# Patient Record
Sex: MALE | Race: WHITE
[De-identification: names, ages, dates, MRNs, and addresses within clinical notes are randomized per-mention and may not be internally consistent; named-entity substitution may affect disease eponyms.]

---

## 2017-03-23 ENCOUNTER — HOSPITAL ENCOUNTER (EMERGENCY)
Dept: HOSPITAL 45 - C.EDB | Age: 72
Discharge: HOME | End: 2017-03-23
Payer: COMMERCIAL

## 2017-03-23 ENCOUNTER — HOSPITAL ENCOUNTER (OUTPATIENT)
Dept: HOSPITAL 45 - C.RAD1850 | Age: 72
Discharge: HOME | End: 2017-03-23
Attending: NURSE PRACTITIONER
Payer: COMMERCIAL

## 2017-03-23 VITALS
WEIGHT: 207.68 LBS | BODY MASS INDEX: 28.13 KG/M2 | BODY MASS INDEX: 28.13 KG/M2 | HEIGHT: 72.01 IN | HEIGHT: 72.01 IN | WEIGHT: 207.68 LBS

## 2017-03-23 VITALS — TEMPERATURE: 99.68 F | OXYGEN SATURATION: 97 %

## 2017-03-23 VITALS — HEART RATE: 60 BPM | SYSTOLIC BLOOD PRESSURE: 126 MMHG | DIASTOLIC BLOOD PRESSURE: 67 MMHG

## 2017-03-23 DIAGNOSIS — R07.81: ICD-10-CM

## 2017-03-23 DIAGNOSIS — R07.81: Primary | ICD-10-CM

## 2017-03-23 DIAGNOSIS — R93.8: ICD-10-CM

## 2017-03-23 DIAGNOSIS — Z79.899: ICD-10-CM

## 2017-03-23 DIAGNOSIS — Z79.82: ICD-10-CM

## 2017-03-23 DIAGNOSIS — I45.10: ICD-10-CM

## 2017-03-23 DIAGNOSIS — Z88.8: ICD-10-CM

## 2017-03-23 DIAGNOSIS — I71.4: ICD-10-CM

## 2017-03-23 DIAGNOSIS — Z87.442: ICD-10-CM
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
## 2017-03-23 DIAGNOSIS — I27.2: ICD-10-CM

## 2017-03-23 DIAGNOSIS — S20.211A: Primary | ICD-10-CM

## 2017-03-23 LAB
ANION GAP SERPL CALC-SCNC: 19 MMOL/L (ref 16–25)
ANION GAP SERPL CALC-SCNC: 7 MMOL/L (ref 3–11)
BASOPHILS # BLD: 0.03 K/UL (ref 0–0.2)
BASOPHILS NFR BLD: 0.5 %
BUN SERPL-MCNC: 17 MG/DL (ref 7–18)
BUN/CREAT SERPL: 18.8 (ref 10–20)
CA-I BLD-SCNC: 1.17 MMOL/L (ref 1.12–1.32)
CALCIUM SERPL-MCNC: 8.7 MG/DL (ref 8.5–10.1)
CHLORIDE BLD-SCNC: 100 MEQ/L (ref 101–112)
CHLORIDE SERPL-SCNC: 104 MMOL/L (ref 98–107)
CO2 SERPL-SCNC: 29 MMOL/L (ref 21–32)
COMPLETE: YES
CREAT BLD-MCNC: 0.8 MG/DL (ref 0.6–1.3)
CREAT CL PREDICTED SERPL C-G-VRATE: 93.1 ML/MIN
CREAT SERPL-MCNC: 0.88 MG/DL (ref 0.6–1.4)
EOSINOPHIL NFR BLD AUTO: 226 K/UL (ref 130–400)
GLUCOSE SERPL-MCNC: 157 MG/DL (ref 70–99)
HCT VFR BLD AUTO: 41 % (ref 42–52)
HCT VFR BLD CALC: 40.2 % (ref 42–52)
HGB BLD-MCNC: 13.9 G/DL (ref 14–18)
IG%: 0.3 %
IMM GRANULOCYTES NFR BLD AUTO: 23 %
ISTAT CARBON DIOXIDE: 27 MEQ/L (ref 24–31)
LYMPHOCYTES # BLD: 1.45 K/UL (ref 1.2–3.4)
MCH RBC QN AUTO: 30.5 PG (ref 25–34)
MCHC RBC AUTO-ENTMCNC: 33.1 G/DL (ref 32–36)
MCV RBC AUTO: 92.2 FL (ref 80–100)
MONOCYTES NFR BLD: 6.2 %
NEUTROPHILS # BLD AUTO: 1.3 %
NEUTROPHILS NFR BLD AUTO: 68.7 %
PMV BLD AUTO: 10.1 FL (ref 7.4–10.4)
POTASSIUM SERPL-SCNC: 3.9 MMOL/L (ref 3.5–5.1)
RBC # BLD AUTO: 4.36 M/UL (ref 4.7–6.1)
SODIUM BLD-SCNC: 141 MEQ/L (ref 135–144)
SODIUM SERPL-SCNC: 140 MMOL/L (ref 136–145)
WBC # BLD AUTO: 6.3 K/UL (ref 4.8–10.8)

## 2017-03-23 NOTE — EMERGENCY ROOM VISIT NOTE
History


First contact with patient:  16:02


 (Quoc Lira MD)


First contact with patient:  16:00


 (Ifeanyi Spain M.D.)


Chief Complaint:  REFERRED BY DOCTOR


Stated Complaint:  NEED CAT SCAN ON CHEST





History of Present Illness


The patient is a 71 year old male who presents to the Emergency Room with 

complaints of chest wall trauma. 3 days ago, patient was lifting a heavy table 

and while attempting to carry the table through a doorway, it hit the door jam 

and he got hit in the Right  chest. Patient experienced associated generalized 

chest pain worse with inspiration. Pain improved significantly with Ibuprofen 

600mg but after 3 days, the pain never completely subsided, so he decided to go 

into [facility] walk-in clinic for evaluation. There, he received a CXR showing 

pulmonary infiltrate and what sent for further evaluation in the ED.  Currently 

he has minimal pain at rest and 5/10 pain on inspiration. 





He denies, Cough, fevers/chills. Pt denies headache, change in vision, fevers, 

, shortness of breath, nausea, vomiting, diarrhea, pain with urination, and 

melena.





 


 (Quoc Lira MD)





Review of Systems


See HPI for pertinent positives & negatives. A total of 10 systems reviewed and 

were otherwise negative.


 (Quoc Lira MD)





Past Medical/Surgical History


Medical Problems:


(1) Recurrent kidney stones


 (Ifeanyi Spain M.D.)





Family History





No pertinent family history (Quoc Lira MD)





No pertinent family history (Ifeanyi Spain M.D.)


Social History


Smoking Status:  Never Smoker


Marital Status:  


Housing Status:  lives with significant other


 (Quoc Lira MD)





Current/Historical Medications


Scheduled


Ascorbic Acid (Ascorbic Acid), 1 TAB PO DAILY


Aspirin (Aspirin Ec), 81 MG PO DAILY


Cholecalciferol (Vitamin D3), 1 CAP PO DAILY


Coenzyme Q10 (Ubidecarenone) (Co-Enzyme Q10), 200 MG PO BID


Cyanocobalamin (Vitamin B12), 1,200 MCG PO DAILY


Fish Oil (Omega-3), 1,200 MG PO DAILY


Levothyroxine Sodium (Levothyroxine Sodium), 1 TAB PO DAILY


Losartan Potassium (Cozaar), 50 MG PO DAILY


Metformin Hcl (Glucophage), 1,000 MG PO BID


Simvastatin (Zocor), 20 MG PO QPM





Allergies


Coded Allergies:  


     Atorvastatin (Verified  Allergy, Mild, ., 3/23/17)


 MUSCLE CRAMPING





Physical Exam


Vital Signs











  Date Time  Temp Pulse Resp B/P Pulse Ox O2 Delivery O2 Flow Rate FiO2


 


3/23/17 18:47  60 16 126/67    


 


3/23/17 17:36  57 17 129/79  Room Air  


 


3/23/17 15:58 37.6 69 18 144/83 97 Room Air  





 (Ifeanyi Spain M.D.)





Physical Exam


GENERAL: alert, well appearing, well nourished, no distress, non-toxic 


EYE EXAM: normal conjunctiva, PERRL and EOM's grossly intact


NECK: supple, no nuchal rigidity, no adenopathy, non-tender


LUNGS: Clear to auscultation. Normal chest wall mechanics


HEART: no murmurs, S1 normal and S2 normal 


CHEST : Right sided lower rib tenderness to palpation, no abrasion, no erythema

, no ecchymoses 


ABDOMEN: abdomen soft, non-tender, normo-active bowel sounds, no masses, no 

rebound or guarding. 


SKIN: no rashes and no bruising 


UPPER EXTREMITIES: upper extremities are grossly normal. 


LOWER EXTREMITIES: No pitting edema.





 (Quoc Lira MD)





Medical Decision & Procedures


ER Provider


Diagnostic Interpretation:


CT SCAN OF THE CHEST WITH IV CONTRAST





CLINICAL HISTORY: Unspecified chest trauma.





COMPARISON STUDY:  Chest x-ray dated 3/23/2017. Abdominal CT dated 11/11/2016.





TECHNIQUE: Following the IV administration of 93 cc of Optiray 320, CT scan of


the thorax was performed from the thoracic inlet to the upper abdomen. Images


are reviewed in the axial, sagittal, and coronal planes. IV contrast was


administered without complication.





CT DOSE: 403.70 mGycm





FINDINGS:





Thyroid: Markedly atrophic.





Thoracic aorta: There is atherosclerotic calcification of the thoracic aorta.


There is mild aneurysmal dilatation of the ascending thoracic aorta which


measures up to 4.2 cm. The remainder of the thoracic aorta is normal in caliber


and the arch demonstrates standard 3-vessel anatomy. No dissection is seen.





Pulmonary vasculature: The main pulmonary arteries are significantly dilated


suggesting pulmonary artery hypertension. There are no filling defects


identified in the central pulmonary vessels to indicate pulmonary embolus. Note


that this examination was not protocoled for evaluation of the pulmonary


arteries.





Heart: The heart is mildly enlarged and without pericardial effusion. The


coronary arteries are densely calcified.





Lungs and pleural spaces: A fat-containing Bochdalek hernia is noted at the left


lung base. No airspace consolidation or pleural effusion is identified. The


trachea and central airways are clear. There is no pneumothorax. A calcified


granuloma seen at the right lung base.





Mediastinum: There is no mediastinal hematoma or lymphadenopathy.





Estefany: No hilar adenopathy is seen. There are calcified right hilar nodes.





Axillae: There is no axillary lymphadenopathy.





Upper abdomen: There is a small hiatal hernia. Diverticulosis is noted in the


partially imaged colon. There is moderate glandular atrophy of the imaged


pancreas. An indeterminant 1.2 cm nodule is noted in the left adrenal gland,


which is unchanged from previous. A small calcified granulomas noted in the


spleen.





Skeletal structures: The Skeletal structures are osteopenic. No acute fracture


is seen. No lytic or blastic bony lesions are identified. Degenerative change is


noted throughout the thoracic spine. There is a mild chronic compression


deformity of L1.








IMPRESSION:





1. There is no acute posttraumatic intrathoracic abnormality.





2. The lungs are clear. No pneumothorax is seen.





3. Cardiomegaly with evidence of pulmonary artery hypertension.





4. There is mild aneurysmal dilatation of the ascending thoracic aorta which


measures up to 4.2 cm. The remainder of the thoracic aorta is normal in caliber.





5. Additional findings as above.





 (Quoc Lira MD)





Laboratory Results


3/23/17 16:32








Red Blood Count 4.36, Mean Corpuscular Volume 92.2, Mean Corpuscular Hemoglobin 

30.5, Mean Corpuscular Hemoglobin Concent 33.1, Mean Platelet Volume 10.1, 

Neutrophils (%) (Auto) 68.7, Lymphocytes (%) (Auto) 23.0, Monocytes (%) (Auto) 

6.2, Eosinophils (%) (Auto) 1.3, Basophils (%) (Auto) 0.5, Neutrophils # (Auto) 

4.33, Lymphocytes # (Auto) 1.45, Monocytes # (Auto) 0.39, Eosinophils # (Auto) 

0.08, Basophils # (Auto) 0.03





3/23/17 16:32

















Test


  3/23/17


16:32 3/23/17


16:37


 


White Blood Count


  6.30 K/uL


(4.8-10.8) 


 


 


Red Blood Count


  4.36 M/uL


(4.7-6.1) 


 


 


Hemoglobin


  13.3 g/dL


(14.0-18.0) 


 


 


Hematocrit 40.2 % (42-52)  


 


Mean Corpuscular Volume


  92.2 fL


() 


 


 


Mean Corpuscular Hemoglobin


  30.5 pg


(25-34) 


 


 


Mean Corpuscular Hemoglobin


Concent 33.1 g/dl


(32-36) 


 


 


Platelet Count


  226 K/uL


(130-400) 


 


 


Mean Platelet Volume


  10.1 fL


(7.4-10.4) 


 


 


Neutrophils (%) (Auto) 68.7 %  


 


Lymphocytes (%) (Auto) 23.0 %  


 


Monocytes (%) (Auto) 6.2 %  


 


Eosinophils (%) (Auto) 1.3 %  


 


Basophils (%) (Auto) 0.5 %  


 


Neutrophils # (Auto)


  4.33 K/uL


(1.4-6.5) 


 


 


Lymphocytes # (Auto)


  1.45 K/uL


(1.2-3.4) 


 


 


Monocytes # (Auto)


  0.39 K/uL


(0.11-0.59) 


 


 


Eosinophils # (Auto)


  0.08 K/uL


(0-0.5) 


 


 


Basophils # (Auto)


  0.03 K/uL


(0-0.2) 


 


 


RDW Standard Deviation


  43.6 fL


(36.4-46.3) 


 


 


RDW Coefficient of Variation


  12.9 %


(11.5-14.5) 


 


 


Immature Granulocyte % (Auto) 0.3 %  


 


Immature Granulocyte # (Auto)


  0.02 K/uL


(0.00-0.02) 


 


 


Est Creatinine Clear Calc


Drug Dose 93.1 ml/min 


  


 


 


Estimated GFR (


American) 100.9 


  


 


 


Estimated GFR (Non-


American 87.0 


  


 


 


BUN/Creatinine Ratio 18.8 (10-20)  


 


Calcium Level


  8.7 mg/dl


(8.5-10.1) 


 


 


Troponin I


  < 0.015 ng/ml


(0-0.045) 


 


 


Bedside Hemoglobin


  


  13.9 g/dl


(14.0-18.0)


 


Bedside Hematocrit  41 % (42-52) 


 


Bedside Sodium


  


  141 mEq/L


(135-144)


 


Bedside Potassium


  


  3.9 mEq/L


(3.3-5.0)


 


Bedside Chloride


  


  100 mEq/L


(101-112)


 


Bedside Total CO2


  


  27 mEq/l


(24-31)


 


Anion Gap


  


  19.0 mmol/L


(16-25)


 


Bedside Blood Urea Nitrogen


  


  16 mg/dl


(7-18)


 


Bedside Creatinine


  


  0.8 mg/dl


(0.6-1.3)


 


Bedside Glucose (other)


  


  158 mg/dl


(70-99)


 


Bedside Ionized Calcium (Guido)


  


  1.17 mmol/l


(1.12-1.32)





 (Ifeanyi Spain M.D.)





Medications Administered











 Medications


  (Trade)  Dose


 Ordered  Sig/Navid


 Route  Start Time


 Stop Time Status Last Admin


Dose Admin


 


 Sodium Chloride


  (Nss 1000ml)  1,000 ml @ 


 999 mls/hr  Q1H1M STAT


 IV  3/23/17 16:19


 3/23/17 17:19 DC 3/23/17 16:47


999 MLS/HR





 (Ifeanyi Spain M.D.)





ECG


Rhythm:  sinus bradycardia


Findings:  PAC, RBBB, other (Left Ant. Fascicular BLock)


 (Quoc Lira MD)





Medical Decision


Differential diagnoses includes but is not limited to Chest wall trauma,  acute 

coronary syndrome, myocardial infarction, pericarditis, pulmonary embolus, 

aortic dissection, pneumonia, pneumothorax, musculoskeletal, shingles, 

esophageal. 





70 yo M p/w hx of chest wall trauma 3 days ago  while carrying table through a 

door way p/w with pleuritic CP, lower rib tenderness with. CXR showing 

pulmonary infiltrate


EKG Sinus bradycardia with PAC's RBBB, L Ant. fascicular block. RBBB, Left ant. 

fascicular block appeared on previous EKG (2005), Troponin neg. H&H: 13.3/40.2





Suspected Rib Contusion:


- CT Chest with Contrast: no acute posttraumatic intrathoracic abnormality. no 

ptx, pulmonary artery hypertension.  mild aneurysmal dilatation of the 

ascending thoracic aorta which


measures up to 4.2 cm. 





- Discharged with OTC Ibuprofen/ Tylenol as needed with followup with 

cardiology for abnormal EKG,  Pulmonary HTN, Dilated thoracic ascending aorta


 (Quoc Lira MD)


Resident Physician Supervision Note:


Dr. Lira was resident physician during care of patient.  I separately 

evaluated patient and did history and exam. I discussed the case with the 

resident and generally agree with the findings and plan.  Please see my full 

note for this visit.





Documented By:  Ifeanyi Spain MD


 (Ifeanyi Spain M.D.)





Impression





 Primary Impression:  


 Rib tenderness


 Additional Impressions:  


 Contusion of rib on right side


 Pulmonary hypertension


 Abnormal EKG


 Aneurysm of ascending aorta





Departure Information


Dispostion


Home / Self-Care





Condition


GOOD





Referrals


Dominique Mckeon M.D. (PCP)





Patient Instructions


My [facility]





Resident Tracking


Resident Involvement:  Resident Care Provided


Care Provided:  Adult ED


 (Quoc Lira MD)





Problem Qualifiers








 Additional Impressions:  


 Contusion of rib on right side


 Encounter type:  initial encounter  Qualified Codes:  S20.211A - Contusion of 

right front wall of thorax, initial encounter

## 2017-03-23 NOTE — DIAGNOSTIC IMAGING REPORT
CHEST 2 VIEWS ROUTINE



CLINICAL HISTORY: PLEURODYNIA chest pain



COMPARISON STUDY:  1/22/2007



FINDINGS: Small parenchymal infiltrate posterior aspect left lower lobe. Lungs

otherwise appear clear. No evidence for cardiac enlargement. 



IMPRESSION:  Small parenchymal infiltrate posterior left lower lobe 









Electronically signed by:  Marquise Diehl M.D.

3/23/2017 2:15 PM



Dictated Date/Time:  3/23/2017 2:14 PM

## 2017-03-23 NOTE — EMERGENCY ROOM VISIT NOTE
History


Report prepared by Freida:  Mark Ray


Under the Supervision of:  Dr. Ifeanyi Spain M.D.


First contact with patient:  16:00


Chief Complaint:  REFERRED BY DOCTOR


Stated Complaint:  NEED CAT SCAN ON CHEST





History of Present Illness


The patient is a 71 year old male who was referred to the Emergency Room for a 

CT chest. The patient was carrying a heavy table three days ago when he walked 

into the door frame and hit his chest with the table. The patient has had right-

sided chest pain since, which is minimal. The pain is relieved with Ibuprofen. 

The pain is worse with inspiration. The patient denies any fevers or cough. He 

was seen at a walk-in clinic earlier today where he had a Chest X-ray. The X-

ray showed infiltrate in the left lung, which is the opposite side from where 

his pain is located. The patient is not a smoker. He denies any family history 

of lung cancer.





   Source of History:  patient


   Onset:  three days ago


   Position:  chest (right)


   Modifying Factors (Worsening):  breathing (inspiration)


   Modifying Factors (Relieving):  ibuprofen


   Associated Symptoms:  No cough, No fevers





Review of Systems


See HPI for pertinent positives & negatives. A total of 10 systems reviewed and 

were otherwise negative.





Past Medical & Surgical


Medical Problems:


(1) Recurrent kidney stones








Family History





No pertinent family history





Social History


Smoking Status:  Never Smoker


Marital Status:  


Housing Status:  lives with significant other





Current/Historical Medications


Scheduled


Ascorbic Acid (Ascorbic Acid), 1 TAB PO DAILY


Aspirin (Aspirin Ec), 81 MG PO DAILY


Cholecalciferol (Vitamin D3), 1 CAP PO DAILY


Coenzyme Q10 (Ubidecarenone) (Co-Enzyme Q10), 200 MG PO BID


Cyanocobalamin (Vitamin B12), 1,200 MCG PO DAILY


Fish Oil (Omega-3), 1,200 MG PO DAILY


Levothyroxine Sodium (Levothyroxine Sodium), 1 TAB PO DAILY


Losartan Potassium (Cozaar), 50 MG PO DAILY


Metformin Hcl (Glucophage), 1,000 MG PO BID


Simvastatin (Zocor), 20 MG PO QPM





Allergies


Coded Allergies:  


     Atorvastatin (Verified  Allergy, Mild, ., 3/23/17)


 MUSCLE CRAMPING





Physical Exam


Vital Signs











  Date Time  Temp Pulse Resp B/P Pulse Ox O2 Delivery O2 Flow Rate FiO2


 


3/23/17 18:47  60 16 126/67    


 


3/23/17 17:36  57 17 129/79  Room Air  


 


3/23/17 15:58 37.6 69 18 144/83 97 Room Air  











Physical Exam


GENERAL: Patient is well appearing and in no acute distress.


HEENT: No acute trauma, normocephalic atraumatic, mucous membranes moist, no 

nasal congestion, no scleral icterus.


NECK: No stridor, no adenopathy, no meningismus, trachea is midline.


CHEST: Tender to palpation over the anterior right lower ribs, no right upper 

quadrant tenderness.


LUNGS: No dyspnea. Clear to auscultation and equal bilaterally. No wheeze, no 

rhonchi.


HEART: Regular rate and rhythm.  No murmurs, rubs, gallops appreciated.


ABDOMEN: Soft, nontender, bowel sounds positive, no masses appreciated, no 

peritonitis.


BACK: No midline tenderness, no CVA tenderness


EXTREMITIES: Normal motion all extremities, no cyanosis, no edema.


NEUROLOGIC: Alert and oriented, no acute motor or sensory deficits, no focal 

weakness, cranial nerves grossly intact.


SKIN: No rash, no jaundice, no diaphoresis.





Medical Decision & Procedures


ER Provider


Diagnostic Interpretation:


CT results as stated below per interpretation by me and the radiologist: 





CT SCAN OF THE CHEST WITH IV CONTRAST





CLINICAL HISTORY: Unspecified chest trauma.





COMPARISON STUDY:  Chest x-ray dated 3/23/2017. Abdominal CT dated 11/11/2016.





TECHNIQUE: Following the IV administration of 93 cc of Optiray 320, CT scan of


the thorax was performed from the thoracic inlet to the upper abdomen. Images


are reviewed in the axial, sagittal, and coronal planes. IV contrast was


administered without complication.





CT DOSE: 403.70 mGycm





FINDINGS:





Thyroid: Markedly atrophic.





Thoracic aorta: There is atherosclerotic calcification of the thoracic aorta.


There is mild aneurysmal dilatation of the ascending thoracic aorta which


measures up to 4.2 cm. The remainder of the thoracic aorta is normal in caliber


and the arch demonstrates standard 3-vessel anatomy. No dissection is seen.





Pulmonary vasculature: The main pulmonary arteries are significantly dilated


suggesting pulmonary artery hypertension. There are no filling defects


identified in the central pulmonary vessels to indicate pulmonary embolus. Note


that this examination was not protocoled for evaluation of the pulmonary


arteries.





Heart: The heart is mildly enlarged and without pericardial effusion. The


coronary arteries are densely calcified.





Lungs and pleural spaces: A fat-containing Bochdalek hernia is noted at the left


lung base. No airspace consolidation or pleural effusion is identified. The


trachea and central airways are clear. There is no pneumothorax. A calcified


granuloma seen at the right lung base.





Mediastinum: There is no mediastinal hematoma or lymphadenopathy.





Estefany: No hilar adenopathy is seen. There are calcified right hilar nodes.





Axillae: There is no axillary lymphadenopathy.





Upper abdomen: There is a small hiatal hernia. Diverticulosis is noted in the


partially imaged colon. There is moderate glandular atrophy of the imaged


pancreas. An indeterminant 1.2 cm nodule is noted in the left adrenal gland,


which is unchanged from previous. A small calcified granulomas noted in the


spleen.





Skeletal structures: The Skeletal structures are osteopenic. No acute fracture


is seen. No lytic or blastic bony lesions are identified. Degenerative change is


noted throughout the thoracic spine. There is a mild chronic compression


deformity of L1.








IMPRESSION:





1. There is no acute posttraumatic intrathoracic abnormality.





2. The lungs are clear. No pneumothorax is seen.





3. Cardiomegaly with evidence of pulmonary artery hypertension.





4. There is mild aneurysmal dilatation of the ascending thoracic aorta which


measures up to 4.2 cm. The remainder of the thoracic aorta is normal in caliber.





5. Additional findings as above.











Electronically signed by:  Lanre Whitfield M.D.


3/23/2017 5:48 PM





Dictated Date/Time:  3/23/2017 5:39 PM





Laboratory Results


3/23/17 16:32








Red Blood Count 4.36, Mean Corpuscular Volume 92.2, Mean Corpuscular Hemoglobin 

30.5, Mean Corpuscular Hemoglobin Concent 33.1, Mean Platelet Volume 10.1, 

Neutrophils (%) (Auto) 68.7, Lymphocytes (%) (Auto) 23.0, Monocytes (%) (Auto) 

6.2, Eosinophils (%) (Auto) 1.3, Basophils (%) (Auto) 0.5, Neutrophils # (Auto) 

4.33, Lymphocytes # (Auto) 1.45, Monocytes # (Auto) 0.39, Eosinophils # (Auto) 

0.08, Basophils # (Auto) 0.03





3/23/17 16:32

















Test


  3/23/17


16:32 3/23/17


16:37


 


White Blood Count


  6.30 K/uL


(4.8-10.8) 


 


 


Red Blood Count


  4.36 M/uL


(4.7-6.1) 


 


 


Hemoglobin


  13.3 g/dL


(14.0-18.0) 


 


 


Hematocrit 40.2 % (42-52)  


 


Mean Corpuscular Volume


  92.2 fL


() 


 


 


Mean Corpuscular Hemoglobin


  30.5 pg


(25-34) 


 


 


Mean Corpuscular Hemoglobin


Concent 33.1 g/dl


(32-36) 


 


 


Platelet Count


  226 K/uL


(130-400) 


 


 


Mean Platelet Volume


  10.1 fL


(7.4-10.4) 


 


 


Neutrophils (%) (Auto) 68.7 %  


 


Lymphocytes (%) (Auto) 23.0 %  


 


Monocytes (%) (Auto) 6.2 %  


 


Eosinophils (%) (Auto) 1.3 %  


 


Basophils (%) (Auto) 0.5 %  


 


Neutrophils # (Auto)


  4.33 K/uL


(1.4-6.5) 


 


 


Lymphocytes # (Auto)


  1.45 K/uL


(1.2-3.4) 


 


 


Monocytes # (Auto)


  0.39 K/uL


(0.11-0.59) 


 


 


Eosinophils # (Auto)


  0.08 K/uL


(0-0.5) 


 


 


Basophils # (Auto)


  0.03 K/uL


(0-0.2) 


 


 


RDW Standard Deviation


  43.6 fL


(36.4-46.3) 


 


 


RDW Coefficient of Variation


  12.9 %


(11.5-14.5) 


 


 


Immature Granulocyte % (Auto) 0.3 %  


 


Immature Granulocyte # (Auto)


  0.02 K/uL


(0.00-0.02) 


 


 


Est Creatinine Clear Calc


Drug Dose 93.1 ml/min 


  


 


 


Estimated GFR (


American) 100.9 


  


 


 


Estimated GFR (Non-


American 87.0 


  


 


 


BUN/Creatinine Ratio 18.8 (10-20)  


 


Calcium Level


  8.7 mg/dl


(8.5-10.1) 


 


 


Troponin I


  < 0.015 ng/ml


(0-0.045) 


 


 


Bedside Hemoglobin


  


  13.9 g/dl


(14.0-18.0)


 


Bedside Hematocrit  41 % (42-52) 


 


Bedside Sodium


  


  141 mEq/L


(135-144)


 


Bedside Potassium


  


  3.9 mEq/L


(3.3-5.0)


 


Bedside Chloride


  


  100 mEq/L


(101-112)


 


Bedside Total CO2


  


  27 mEq/l


(24-31)


 


Anion Gap


  


  19.0 mmol/L


(16-25)


 


Bedside Blood Urea Nitrogen


  


  16 mg/dl


(7-18)


 


Bedside Creatinine


  


  0.8 mg/dl


(0.6-1.3)


 


Bedside Glucose (other)


  


  158 mg/dl


(70-99)


 


Bedside Ionized Calcium (Guido)


  


  1.17 mmol/l


(1.12-1.32)





Laboratory results as reviewed by me.





Medications Administered











 Medications


  (Trade)  Dose


 Ordered  Sig/Navid


 Route  Start Time


 Stop Time Status Last Admin


Dose Admin


 


 Sodium Chloride


  (Nss 1000ml)  1,000 ml @ 


 999 mls/hr  Q1H1M STAT


 IV  3/23/17 16:19


 3/23/17 17:19 DC 3/23/17 16:47


999 MLS/HR











ECG


Indication:  chest pain


Rate (beats per minute):  68


Rhythm:  sinus bradycardia


Findings:  PAC, other (bifascicular block, no STEMI)


Change:


Bifascicular block is mildly increased from previous EKG dated 28 Jan. 2005.





ED Course


1600: The patient was evaluated by the Resident.





1619: NSS 1000 ml @ 999 mls/hr.





1623: The patient was evaluated in room A9b. A complete history and physical 

exam was performed.





1845: Reassessed the patient. Discussed the findings with him. He verbalized 

understanding and agreement. The patient is ready for discharge.





Medical Decision


Resident Physician Supervision Note:


Dr. Lira was resident physician during care of patient.  I separately 

evaluated patient and did history and exam. I discussed the case with the 

resident and generally agree with the findings and plan.





71 yr old male with right lower rib trauma 3 days ago followed up with UC and 

noted to have infiltrate LLL base for which he was sent to ED for further 

evaluation.  No distress though with chest trauma seems reasonable ruling out 

significant injury with CT.  LLL infiltrate not noted on exam and patient with 

no symptoms of pneumonia.  Significant second hand smoking history as well.  No 

abdominal TTP nor evidence of abdominal trauma at this time.  Labs 

unremarkable.  EKG does show some progression of interventricular block from 

previous EKG several years ago.  He is in no distress and has not had recent 

ACS like chest pain.  Labs unremarkable.  Noted to be fat hernia LLL which I 

suspect is infiltrate seen on CXR.  Mild aortic dilation though no evidence of 

dissection nor need for emergent intervention.  Pulm HTN noted by CT.  These 

plus EKG changes will need follow up with Cards and thus Case Management asked 

to help facilitate this.  No evidence fracture nor trauma fortunately on CT.





Impression





 Primary Impression:  


 Contusion of rib on right side


 Additional Impressions:  


 Abnormal EKG


 Pulmonary hypertension


 Rib tenderness


 Aneurysm of ascending aorta





Scribe Attestation


The scribe's documentation has been prepared under my direction and personally 

reviewed by me in its entirety. I confirm that the note above accurately 

reflects all work, treatment, procedures, and medical decision making performed 

by me.





Departure Information


Dispostion


Home / Self-Care





Referrals


No Doctor, Assigned (PCP)





Forms


HOME CARE DOCUMENTATION FORM,                                                 

               IMPORTANT VISIT INFORMATION, WORK / SCHOOL INSTRUCTIONS





Patient Instructions


Contusion Bone, My [facility]





Additional Instructions





Please followup with Cardiology following discharge within 1 wk


Take Tylenol or Ibupofen as needed for pain


If symptoms worsen or if you experience Chest pain, Palpitaion, Syncope, please 

call clinic or return to Emergency Dept





Problem Qualifiers








 Primary Impression:  


 Contusion of rib on right side


 Encounter type:  initial encounter  Qualified Codes:  S20.211A - Contusion of 

right front wall of thorax, initial encounter

## 2017-03-23 NOTE — DIAGNOSTIC IMAGING REPORT
CT SCAN OF THE CHEST WITH IV CONTRAST



CLINICAL HISTORY: Unspecified chest trauma.



COMPARISON STUDY:  Chest x-ray dated 3/23/2017. Abdominal CT dated 11/11/2016.



TECHNIQUE: Following the IV administration of 93 cc of Optiray 320, CT scan of

the thorax was performed from the thoracic inlet to the upper abdomen. Images

are reviewed in the axial, sagittal, and coronal planes. IV contrast was

administered without complication.



CT DOSE: 403.70 mGycm



FINDINGS:



Thyroid: Markedly atrophic.



Thoracic aorta: There is atherosclerotic calcification of the thoracic aorta.

There is mild aneurysmal dilatation of the ascending thoracic aorta which

measures up to 4.2 cm. The remainder of the thoracic aorta is normal in caliber

and the arch demonstrates standard 3-vessel anatomy. No dissection is seen.



Pulmonary vasculature: The main pulmonary arteries are significantly dilated

suggesting pulmonary artery hypertension. There are no filling defects

identified in the central pulmonary vessels to indicate pulmonary embolus. Note

that this examination was not protocoled for evaluation of the pulmonary

arteries.



Heart: The heart is mildly enlarged and without pericardial effusion. The

coronary arteries are densely calcified.



Lungs and pleural spaces: A fat-containing Bochdalek hernia is noted at the left

lung base. No airspace consolidation or pleural effusion is identified. The

trachea and central airways are clear. There is no pneumothorax. A calcified

granuloma seen at the right lung base.



Mediastinum: There is no mediastinal hematoma or lymphadenopathy.



Estefany: No hilar adenopathy is seen. There are calcified right hilar nodes.



Axillae: There is no axillary lymphadenopathy.



Upper abdomen: There is a small hiatal hernia. Diverticulosis is noted in the

partially imaged colon. There is moderate glandular atrophy of the imaged

pancreas. An indeterminant 1.2 cm nodule is noted in the left adrenal gland,

which is unchanged from previous. A small calcified granulomas noted in the

spleen.



Skeletal structures: The Skeletal structures are osteopenic. No acute fracture

is seen. No lytic or blastic bony lesions are identified. Degenerative change is

noted throughout the thoracic spine. There is a mild chronic compression

deformity of L1.





IMPRESSION:



1. There is no acute posttraumatic intrathoracic abnormality.



2. The lungs are clear. No pneumothorax is seen.



3. Cardiomegaly with evidence of pulmonary artery hypertension.



4. There is mild aneurysmal dilatation of the ascending thoracic aorta which

measures up to 4.2 cm. The remainder of the thoracic aorta is normal in caliber.



5. Additional findings as above.







Electronically signed by:  Lanre Whitfield M.D.

3/23/2017 5:48 PM



Dictated Date/Time:  3/23/2017 5:39 PM

## 2017-05-16 ENCOUNTER — HOSPITAL ENCOUNTER (OUTPATIENT)
Dept: HOSPITAL 45 - C.RDSM | Age: 72
Discharge: HOME | End: 2017-05-16
Attending: ORTHOPAEDIC SURGERY
Payer: COMMERCIAL

## 2017-05-16 DIAGNOSIS — M25.562: ICD-10-CM

## 2017-05-16 DIAGNOSIS — M25.561: Primary | ICD-10-CM

## 2018-03-02 ENCOUNTER — HOSPITAL ENCOUNTER (OUTPATIENT)
Dept: HOSPITAL 45 - C.RDSM | Age: 73
Discharge: HOME | End: 2018-03-02
Attending: PHYSICIAN ASSISTANT
Payer: COMMERCIAL

## 2018-03-02 DIAGNOSIS — M25.559: Primary | ICD-10-CM

## 2018-03-02 NOTE — DIAGNOSTIC IMAGING REPORT
R HIP UNILATERAL MIN 2 VIEWS



CLINICAL HISTORY: RIGHT HIP PAIN pain



COMPARISON: None.



DISCUSSION: Moderate to rather significant degenerative change right hip joint

space. Several subchondral cysts primarily at the level of the acetabulum. No

evidence for acetabular protrusion.



No evidence for fracture or dislocation. There is no evidence for soft tissue

swelling.



IMPRESSION: Moderate to rather significant degenerative change right hip and

associated acetabulum. No acute process.











The above report was generated using voice recognition software.  It may contain

grammatical, syntax or spelling errors.







Electronically signed by:  Marquise Diehl M.D.

3/2/2018 10:26 AM



Dictated Date/Time:  3/2/2018 10:25 AM

## 2018-04-13 ENCOUNTER — HOSPITAL ENCOUNTER (OUTPATIENT)
Dept: HOSPITAL 45 - C.CTS | Age: 73
Discharge: HOME | End: 2018-04-13
Attending: FAMILY MEDICINE
Payer: COMMERCIAL

## 2018-04-13 DIAGNOSIS — R91.8: ICD-10-CM

## 2018-04-13 DIAGNOSIS — S29.8XXA: ICD-10-CM
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
## 2018-04-13 DIAGNOSIS — I71.2: Primary | ICD-10-CM

## 2018-04-13 NOTE — DIAGNOSTIC IMAGING REPORT
(CHEST) THORAX WITHOUT



CLINICAL HISTORY: 72 years-old Male presenting with F/U ANEURYSM. 



TECHNIQUE: Multidetector CT imaging of the chest was performed without the use

of intravenous contrast. IV contrast: None. A dose lowering technique was used

consistent with the principles of ALARA (as low as reasonably achievable).



COMPARISON: 3/23/2017.



CT DOSE (mGy.cm): The estimated cumulative dose is 358.74 mGy.cm.



FINDINGS:



 topogram: Unremarkable.



On soft tissue windows, thyroid poorly visualized. Otherwise normal thoracic

inlet. No axillary, supraclavicular, or mediastinal lymphadenopathy. Evaluation

of the erica limited without intravenous contrast. Atherosclerosis of the aorta.

The osseous and aorta measures 4.1 cm in diameter, which is minimally ectatic.

The descending aorta is normal in caliber. Minimally ectatic main pulmonary

artery, which measures 3.1 cm. Normal heart size. Coronary artery calcification.

No pericardial or pleural effusion. Borderline hepatic steatosis. Suggestion of

layering gallstones or gallbladder sludge. Bilateral nonobstructing renal

calculi. Pancolonic diverticulosis.



On lung windows, peripheral ill-defined groundglass 3 mm nodule in the right

upper lobe (series 4 image 132), unchanged calcified granuloma noted in the

right lower lobe. The minimal dependent changes likely atelectasis.

Fat-containing left Bochdalek hernia noted. Airways patent.



On bone windows, degenerative changes of the spine.



IMPRESSION:

1.  No acute intrathoracic pathology.



2.  Stable appearance of the minimally ectatic 4.1 cm ascending aorta.



3.  3 mm groundglass nodule in the right upper lobe. Follow-up per [employer]

Society 2017 recommendations below.



Please refer to below summary of Fleischner Society 2017 recommendations for

follow-up of incidental CT nodules (H Nj et al. Guidelines for management

of incidental pulmonary nodules detected on CT images: From the Fleischner

Society 2017. Radiology 2017; 284: 228-243.)



SOLID NODULES



Single nodule; size < 6 mm

*  Low risk patients: No routine follow-up

*  High risk patients: Optional CT at 12 months



Single nodule; size 6-8 mm

*  Low risk patients: CT at 6-12 months, then consider CT at 18-24 months

*  High risk patients: CT at 6-12 months, then at 18-24 months



Single nodule; size > 8 mm

*  Either low or high risk patients: Considered CT at 3 months, PET/CT, or

tissue sampling



Multiple nodules; size < 6 mm

*  Low risk patients: No routine follow up

*  High risk patients: Optional CT at 12 months



Multiple nodules; size 6-8 mm

*  Low risk patients: CT at 3-6 months, then consider CT at 18-24 months

*  High risk patients: CT at 3-6 months, then at 18-24 months



Multiple nodules; size > 8 mm

*  Low risk patients: CT at 3-6 months, then consider at 18-24 months

*  High risk patients: CT at 3-6 months, then at 18-24 months





SUBSOLID NODULES



Single ground-glass nodule

*  Nodule size < 6 mm: No routine follow-up

*  Nodule size > or = 6 mm: CT at 6-12 months to confirm persistence, then CT

every 2 years until 5 years



Single part-solid nodule

*  Nodule size < 6 mm: No routine follow-up

*  Nodules size > or = 6 mm: CT at 3-6 months to confirm persistence. If

unchanged and solid component remains < 6 mm, annual CT should be performed for

5 years



Multiple nodules

*  Nodule size < 6 mm: CT at 3-6 months. If stable, consider CT at 2 and 4

years.

*  Nodules size > or = 6 mm: CT at 3-6 months. Subsequent management based on

the most suspicious nodule(s)





NOTE: 



These guidelines apply to incidental nodules. These guidelines do not apply to

patients younger than 35 years, immunocompromised patients, or patients with

cancer.

*  Low risk patients: Minimal or absent history of smoking and/or other known

risk factors

*  High risk patients: History of smoking, exposure to other carcinogens,

emphysema, fibrosis, upper lobe location, family history of lung cancer, etc. 

If a nodule up to 8 mm is partly solid or is ground glass, further follow-up is

required after 24 months to exclude possible slow growing adenocarcinoma.

       







Electronically signed by:  Andre Pierson M.D.

4/13/2018 4:56 PM



Dictated Date/Time:  4/13/2018 4:51 PM